# Patient Record
Sex: FEMALE | Race: WHITE | NOT HISPANIC OR LATINO | Employment: OTHER | URBAN - METROPOLITAN AREA
[De-identification: names, ages, dates, MRNs, and addresses within clinical notes are randomized per-mention and may not be internally consistent; named-entity substitution may affect disease eponyms.]

---

## 2023-01-01 ENCOUNTER — PATIENT OUTREACH (OUTPATIENT)
Dept: CARE COORDINATION | Facility: CLINIC | Age: 53
End: 2023-01-01
Payer: COMMERCIAL

## 2023-01-01 ENCOUNTER — TELEPHONE (OUTPATIENT)
Dept: PRIMARY CARE | Facility: CLINIC | Age: 53
End: 2023-01-01
Payer: COMMERCIAL

## 2023-01-01 DIAGNOSIS — E03.9 ACQUIRED HYPOTHYROIDISM: Primary | ICD-10-CM

## 2023-01-01 DIAGNOSIS — C18.9 METASTATIC COLON CANCER TO LIVER (MULTI): Primary | ICD-10-CM

## 2023-01-01 DIAGNOSIS — K12.0 ORAL APHTHOUS ULCER: Primary | ICD-10-CM

## 2023-01-01 DIAGNOSIS — R53.0 NEOPLASTIC MALIGNANT RELATED FATIGUE: ICD-10-CM

## 2023-01-01 DIAGNOSIS — C78.7 METASTATIC COLON CANCER TO LIVER (MULTI): Primary | ICD-10-CM

## 2023-01-01 DIAGNOSIS — G43.809 OTHER MIGRAINE WITHOUT STATUS MIGRAINOSUS, NOT INTRACTABLE: Primary | ICD-10-CM

## 2023-01-01 DIAGNOSIS — R53.0 NEOPLASTIC MALIGNANT RELATED FATIGUE: Primary | ICD-10-CM

## 2023-01-01 DIAGNOSIS — D50.8 IRON DEFICIENCY ANEMIA SECONDARY TO INADEQUATE DIETARY IRON INTAKE: Primary | ICD-10-CM

## 2023-01-01 DIAGNOSIS — J01.00 ACUTE NON-RECURRENT MAXILLARY SINUSITIS: Primary | ICD-10-CM

## 2023-01-01 DIAGNOSIS — K21.9 GASTROESOPHAGEAL REFLUX DISEASE WITHOUT ESOPHAGITIS: ICD-10-CM

## 2023-01-01 DIAGNOSIS — E03.9 HYPOTHYROIDISM, UNSPECIFIED: ICD-10-CM

## 2023-01-01 LAB
ALANINE AMINOTRANSFERASE (SGPT) (U/L) IN SER/PLAS: 34 U/L (ref 7–45)
ALANINE AMINOTRANSFERASE (SGPT) (U/L) IN SER/PLAS: 41 U/L (ref 7–45)
ALANINE AMINOTRANSFERASE (SGPT) (U/L) IN SER/PLAS: 47 U/L (ref 7–45)
ALBUMIN (G/DL) IN SER/PLAS: 4.1 G/DL (ref 3.4–5)
ALBUMIN (G/DL) IN SER/PLAS: 4.2 G/DL (ref 3.4–5)
ALBUMIN (G/DL) IN SER/PLAS: 4.4 G/DL (ref 3.4–5)
ALKALINE PHOSPHATASE (U/L) IN SER/PLAS: 123 U/L (ref 33–110)
ALKALINE PHOSPHATASE (U/L) IN SER/PLAS: 129 U/L (ref 33–110)
ALKALINE PHOSPHATASE (U/L) IN SER/PLAS: 174 U/L (ref 33–110)
ANION GAP IN SER/PLAS: 11 MMOL/L (ref 10–20)
ANION GAP IN SER/PLAS: 12 MMOL/L (ref 10–20)
ANION GAP IN SER/PLAS: 14 MMOL/L (ref 10–20)
ASPARTATE AMINOTRANSFERASE (SGOT) (U/L) IN SER/PLAS: 34 U/L (ref 9–39)
ASPARTATE AMINOTRANSFERASE (SGOT) (U/L) IN SER/PLAS: 40 U/L (ref 9–39)
ASPARTATE AMINOTRANSFERASE (SGOT) (U/L) IN SER/PLAS: 50 U/L (ref 9–39)
BASOPHILS (10*3/UL) IN BLOOD BY AUTOMATED COUNT: 0.05 X10E9/L (ref 0–0.1)
BASOPHILS (10*3/UL) IN BLOOD BY AUTOMATED COUNT: 0.06 X10E9/L (ref 0–0.1)
BASOPHILS (10*3/UL) IN BLOOD BY AUTOMATED COUNT: 0.07 X10E9/L (ref 0–0.1)
BASOPHILS/100 LEUKOCYTES IN BLOOD BY AUTOMATED COUNT: 1.1 % (ref 0–2)
BASOPHILS/100 LEUKOCYTES IN BLOOD BY AUTOMATED COUNT: 1.1 % (ref 0–2)
BASOPHILS/100 LEUKOCYTES IN BLOOD BY AUTOMATED COUNT: 1.2 % (ref 0–2)
BILIRUBIN TOTAL (MG/DL) IN SER/PLAS: 0.4 MG/DL (ref 0–1.2)
BILIRUBIN TOTAL (MG/DL) IN SER/PLAS: 0.4 MG/DL (ref 0–1.2)
BILIRUBIN TOTAL (MG/DL) IN SER/PLAS: 1.1 MG/DL (ref 0–1.2)
CALCIUM (MG/DL) IN SER/PLAS: 10 MG/DL (ref 8.6–10.6)
CALCIUM (MG/DL) IN SER/PLAS: 9.4 MG/DL (ref 8.6–10.6)
CALCIUM (MG/DL) IN SER/PLAS: 9.6 MG/DL (ref 8.6–10.6)
CARBON DIOXIDE, TOTAL (MMOL/L) IN SER/PLAS: 26 MMOL/L (ref 21–32)
CARBON DIOXIDE, TOTAL (MMOL/L) IN SER/PLAS: 27 MMOL/L (ref 21–32)
CARBON DIOXIDE, TOTAL (MMOL/L) IN SER/PLAS: 29 MMOL/L (ref 21–32)
CARCINOEMBRYONIC AG (NG/ML) IN SER/PLAS: 21.8 UG/L
CARCINOEMBRYONIC AG (NG/ML) IN SER/PLAS: 26.2 UG/L
CHLORIDE (MMOL/L) IN SER/PLAS: 102 MMOL/L (ref 98–107)
CHLORIDE (MMOL/L) IN SER/PLAS: 102 MMOL/L (ref 98–107)
CHLORIDE (MMOL/L) IN SER/PLAS: 104 MMOL/L (ref 98–107)
CREATININE (MG/DL) IN SER/PLAS: 0.69 MG/DL (ref 0.5–1.05)
CREATININE (MG/DL) IN SER/PLAS: 0.71 MG/DL (ref 0.5–1.05)
CREATININE (MG/DL) IN SER/PLAS: 0.82 MG/DL (ref 0.5–1.05)
EOSINOPHILS (10*3/UL) IN BLOOD BY AUTOMATED COUNT: 0.14 X10E9/L (ref 0–0.7)
EOSINOPHILS (10*3/UL) IN BLOOD BY AUTOMATED COUNT: 0.28 X10E9/L (ref 0–0.7)
EOSINOPHILS (10*3/UL) IN BLOOD BY AUTOMATED COUNT: 0.38 X10E9/L (ref 0–0.7)
EOSINOPHILS/100 LEUKOCYTES IN BLOOD BY AUTOMATED COUNT: 2.7 % (ref 0–6)
EOSINOPHILS/100 LEUKOCYTES IN BLOOD BY AUTOMATED COUNT: 6.2 % (ref 0–6)
EOSINOPHILS/100 LEUKOCYTES IN BLOOD BY AUTOMATED COUNT: 6.3 % (ref 0–6)
ERYTHROCYTE DISTRIBUTION WIDTH (RATIO) BY AUTOMATED COUNT: 14.1 % (ref 11.5–14.5)
ERYTHROCYTE DISTRIBUTION WIDTH (RATIO) BY AUTOMATED COUNT: 14.9 % (ref 11.5–14.5)
ERYTHROCYTE DISTRIBUTION WIDTH (RATIO) BY AUTOMATED COUNT: 15.1 % (ref 11.5–14.5)
ERYTHROCYTE MEAN CORPUSCULAR HEMOGLOBIN CONCENTRATION (G/DL) BY AUTOMATED: 31.6 G/DL (ref 32–36)
ERYTHROCYTE MEAN CORPUSCULAR HEMOGLOBIN CONCENTRATION (G/DL) BY AUTOMATED: 31.7 G/DL (ref 32–36)
ERYTHROCYTE MEAN CORPUSCULAR HEMOGLOBIN CONCENTRATION (G/DL) BY AUTOMATED: 32.6 G/DL (ref 32–36)
ERYTHROCYTE MEAN CORPUSCULAR VOLUME (FL) BY AUTOMATED COUNT: 85 FL (ref 80–100)
ERYTHROCYTE MEAN CORPUSCULAR VOLUME (FL) BY AUTOMATED COUNT: 90 FL (ref 80–100)
ERYTHROCYTE MEAN CORPUSCULAR VOLUME (FL) BY AUTOMATED COUNT: 90 FL (ref 80–100)
ERYTHROCYTES (10*6/UL) IN BLOOD BY AUTOMATED COUNT: 4.44 X10E12/L (ref 4–5.2)
ERYTHROCYTES (10*6/UL) IN BLOOD BY AUTOMATED COUNT: 4.46 X10E12/L (ref 4–5.2)
ERYTHROCYTES (10*6/UL) IN BLOOD BY AUTOMATED COUNT: 4.78 X10E12/L (ref 4–5.2)
GFR FEMALE: 85 ML/MIN/1.73M2
GFR FEMALE: >90 ML/MIN/1.73M2
GFR FEMALE: >90 ML/MIN/1.73M2
GLUCOSE (MG/DL) IN SER/PLAS: 89 MG/DL (ref 74–99)
GLUCOSE (MG/DL) IN SER/PLAS: 89 MG/DL (ref 74–99)
GLUCOSE (MG/DL) IN SER/PLAS: 90 MG/DL (ref 74–99)
HEMATOCRIT (%) IN BLOOD BY AUTOMATED COUNT: 38 % (ref 36–46)
HEMATOCRIT (%) IN BLOOD BY AUTOMATED COUNT: 39.8 % (ref 36–46)
HEMATOCRIT (%) IN BLOOD BY AUTOMATED COUNT: 43 % (ref 36–46)
HEMOGLOBIN (G/DL) IN BLOOD: 12.4 G/DL (ref 12–16)
HEMOGLOBIN (G/DL) IN BLOOD: 12.6 G/DL (ref 12–16)
HEMOGLOBIN (G/DL) IN BLOOD: 13.6 G/DL (ref 12–16)
IMMATURE GRANULOCYTES/100 LEUKOCYTES IN BLOOD BY AUTOMATED COUNT: 0.4 % (ref 0–0.9)
IMMATURE GRANULOCYTES/100 LEUKOCYTES IN BLOOD BY AUTOMATED COUNT: 1.1 % (ref 0–0.9)
IMMATURE GRANULOCYTES/100 LEUKOCYTES IN BLOOD BY AUTOMATED COUNT: 2 % (ref 0–0.9)
INR IN PPP BY COAGULATION ASSAY: 0.9 (ref 0.9–1.1)
LEUKOCYTES (10*3/UL) IN BLOOD BY AUTOMATED COUNT: 4.5 X10E9/L (ref 4.4–11.3)
LEUKOCYTES (10*3/UL) IN BLOOD BY AUTOMATED COUNT: 5.2 X10E9/L (ref 4.4–11.3)
LEUKOCYTES (10*3/UL) IN BLOOD BY AUTOMATED COUNT: 6.1 X10E9/L (ref 4.4–11.3)
LYMPHOCYTES (10*3/UL) IN BLOOD BY AUTOMATED COUNT: 1.6 X10E9/L (ref 1.2–4.8)
LYMPHOCYTES (10*3/UL) IN BLOOD BY AUTOMATED COUNT: 1.6 X10E9/L (ref 1.2–4.8)
LYMPHOCYTES (10*3/UL) IN BLOOD BY AUTOMATED COUNT: 1.67 X10E9/L (ref 1.2–4.8)
LYMPHOCYTES/100 LEUKOCYTES IN BLOOD BY AUTOMATED COUNT: 27.2 % (ref 13–44)
LYMPHOCYTES/100 LEUKOCYTES IN BLOOD BY AUTOMATED COUNT: 30.9 % (ref 13–44)
LYMPHOCYTES/100 LEUKOCYTES IN BLOOD BY AUTOMATED COUNT: 35.9 % (ref 13–44)
MONOCYTES (10*3/UL) IN BLOOD BY AUTOMATED COUNT: 0.14 X10E9/L (ref 0.1–1)
MONOCYTES (10*3/UL) IN BLOOD BY AUTOMATED COUNT: 0.66 X10E9/L (ref 0.1–1)
MONOCYTES (10*3/UL) IN BLOOD BY AUTOMATED COUNT: 0.67 X10E9/L (ref 0.1–1)
MONOCYTES/100 LEUKOCYTES IN BLOOD BY AUTOMATED COUNT: 10.9 % (ref 2–10)
MONOCYTES/100 LEUKOCYTES IN BLOOD BY AUTOMATED COUNT: 14.8 % (ref 2–10)
MONOCYTES/100 LEUKOCYTES IN BLOOD BY AUTOMATED COUNT: 2.7 % (ref 2–10)
NEUTROPHILS (10*3/UL) IN BLOOD BY AUTOMATED COUNT: 1.78 X10E9/L (ref 1.2–7.7)
NEUTROPHILS (10*3/UL) IN BLOOD BY AUTOMATED COUNT: 3.22 X10E9/L (ref 1.2–7.7)
NEUTROPHILS (10*3/UL) IN BLOOD BY AUTOMATED COUNT: 3.28 X10E9/L (ref 1.2–7.7)
NEUTROPHILS/100 LEUKOCYTES IN BLOOD BY AUTOMATED COUNT: 39.9 % (ref 40–80)
NEUTROPHILS/100 LEUKOCYTES IN BLOOD BY AUTOMATED COUNT: 53.5 % (ref 40–80)
NEUTROPHILS/100 LEUKOCYTES IN BLOOD BY AUTOMATED COUNT: 62.1 % (ref 40–80)
NRBC (PER 100 WBCS) BY AUTOMATED COUNT: 0 /100 WBC (ref 0–0)
PLATELETS (10*3/UL) IN BLOOD AUTOMATED COUNT: 194 X10E9/L (ref 150–450)
PLATELETS (10*3/UL) IN BLOOD AUTOMATED COUNT: 211 X10E9/L (ref 150–450)
PLATELETS (10*3/UL) IN BLOOD AUTOMATED COUNT: 259 X10E9/L (ref 150–450)
POTASSIUM (MMOL/L) IN SER/PLAS: 4.2 MMOL/L (ref 3.5–5.3)
POTASSIUM (MMOL/L) IN SER/PLAS: 4.4 MMOL/L (ref 3.5–5.3)
POTASSIUM (MMOL/L) IN SER/PLAS: 5 MMOL/L (ref 3.5–5.3)
PROTEIN TOTAL: 6.8 G/DL (ref 6.4–8.2)
PROTEIN TOTAL: 7 G/DL (ref 6.4–8.2)
PROTEIN TOTAL: 7.1 G/DL (ref 6.4–8.2)
PROTHROMBIN TIME (PT) IN PPP BY COAGULATION ASSAY: 10.7 SEC (ref 9.8–13.4)
SODIUM (MMOL/L) IN SER/PLAS: 137 MMOL/L (ref 136–145)
SODIUM (MMOL/L) IN SER/PLAS: 137 MMOL/L (ref 136–145)
SODIUM (MMOL/L) IN SER/PLAS: 140 MMOL/L (ref 136–145)
THYROTROPIN (MIU/L) IN SER/PLAS BY DETECTION LIMIT <= 0.05 MIU/L: 2.98 MIU/L (ref 0.44–3.98)
THYROXINE (T4) FREE (NG/DL) IN SER/PLAS: 1.38 NG/DL (ref 0.78–1.48)
TRIIODOTHYRONINE (T3) FREE (PG/ML) IN SER/PLAS: 2.9 PG/ML (ref 2.3–4.2)
UREA NITROGEN (MG/DL) IN SER/PLAS: 11 MG/DL (ref 6–23)
UREA NITROGEN (MG/DL) IN SER/PLAS: 12 MG/DL (ref 6–23)
UREA NITROGEN (MG/DL) IN SER/PLAS: 20 MG/DL (ref 6–23)

## 2023-01-01 RX ORDER — PANTOPRAZOLE SODIUM 40 MG/1
40 TABLET, DELAYED RELEASE ORAL DAILY
Qty: 30 TABLET | Refills: 11 | Status: SHIPPED | OUTPATIENT
Start: 2023-01-01 | End: 2024-02-05

## 2023-01-01 RX ORDER — LEVOTHYROXINE SODIUM 125 UG/1
125 TABLET ORAL DAILY
Qty: 90 TABLET | Refills: 3 | Status: SHIPPED | OUTPATIENT
Start: 2023-01-01 | End: 2024-02-05

## 2023-01-01 RX ORDER — OXYCODONE HYDROCHLORIDE 5 MG/1
1 TABLET ORAL EVERY 4 HOURS PRN
COMMUNITY
Start: 2023-01-01 | End: 2024-01-01 | Stop reason: SDUPTHER

## 2023-01-01 RX ORDER — LEVOTHYROXINE SODIUM 125 UG/1
TABLET ORAL
Qty: 90 TABLET | Refills: 0 | Status: CANCELLED | OUTPATIENT
Start: 2023-01-01

## 2023-01-01 RX ORDER — IBUPROFEN 200 MG
200-400 TABLET ORAL EVERY 2 HOUR PRN
COMMUNITY
Start: 2023-01-01

## 2023-01-01 RX ORDER — LEVOTHYROXINE SODIUM 125 UG/1
125 TABLET ORAL DAILY
COMMUNITY
Start: 2023-01-01 | End: 2023-01-01 | Stop reason: SDUPTHER

## 2023-01-01 RX ORDER — CHLORHEXIDINE GLUCONATE 4 %
3 LIQUID (ML) TOPICAL NIGHTLY PRN
COMMUNITY

## 2023-01-01 RX ORDER — ESTRADIOL 10 UG/1
1 INSERT VAGINAL 2 TIMES WEEKLY
COMMUNITY
Start: 2021-05-04

## 2023-01-01 RX ORDER — HEPARIN SODIUM,PORCINE 10 UNIT/ML
20-50 VIAL (ML) INTRAVENOUS 3 TIMES DAILY
COMMUNITY
Start: 2022-11-02

## 2023-01-01 RX ORDER — BUTALBITAL, ACETAMINOPHEN AND CAFFEINE 50; 325; 40 MG/1; MG/1; MG/1
1-2 TABLET ORAL EVERY 6 HOURS PRN
Qty: 30 TABLET | Refills: 0 | Status: SHIPPED | OUTPATIENT
Start: 2023-01-01 | End: 2024-02-05

## 2023-01-01 RX ORDER — FLUTICASONE PROPIONATE 50 MCG
2 SPRAY, SUSPENSION (ML) NASAL DAILY
Qty: 16 G | Refills: 11 | Status: SHIPPED | OUTPATIENT
Start: 2023-01-01 | End: 2024-02-05

## 2023-01-01 RX ORDER — ENOXAPARIN SODIUM 100 MG/ML
80 INJECTION SUBCUTANEOUS
COMMUNITY
Start: 2022-03-11

## 2023-01-01 RX ORDER — LORAZEPAM 0.5 MG/1
0.5 TABLET ORAL DAILY PRN
COMMUNITY
Start: 2021-06-08

## 2023-01-01 RX ORDER — ONDANSETRON HYDROCHLORIDE 8 MG/1
1 TABLET, FILM COATED ORAL EVERY 8 HOURS
COMMUNITY
Start: 2022-08-29

## 2023-01-01 RX ORDER — ESCITALOPRAM OXALATE 10 MG/1
10 TABLET ORAL
Qty: 90 TABLET | Refills: 3
Start: 2023-01-01 | End: 2024-02-05

## 2023-01-01 RX ORDER — ESCITALOPRAM OXALATE 10 MG/1
10 TABLET ORAL
COMMUNITY
End: 2023-01-01 | Stop reason: SDUPTHER

## 2023-01-01 RX ORDER — ACETAMINOPHEN 325 MG/1
2 TABLET ORAL EVERY 4 HOURS PRN
COMMUNITY
Start: 2022-03-11

## 2023-01-01 RX ORDER — ESCITALOPRAM OXALATE 20 MG/1
20 TABLET ORAL
Qty: 90 TABLET | Refills: 3 | Status: SHIPPED | OUTPATIENT
Start: 2023-01-01 | End: 2023-01-01 | Stop reason: SDUPTHER

## 2023-01-01 RX ORDER — AZITHROMYCIN 250 MG/1
TABLET, FILM COATED ORAL
Qty: 6 TABLET | Refills: 0 | Status: SHIPPED | OUTPATIENT
Start: 2023-01-01 | End: 2023-01-01

## 2023-01-01 RX ORDER — DIAZEPAM 10 MG/1
10 TABLET ORAL AS NEEDED
Qty: 7 TABLET | Refills: 0 | Status: SHIPPED | OUTPATIENT
Start: 2023-01-01 | End: 2023-01-01

## 2023-01-01 RX ORDER — DICLOFENAC SODIUM 10 MG/G
2 GEL TOPICAL 4 TIMES DAILY
COMMUNITY
Start: 2023-01-01 | End: 2023-01-01

## 2023-01-01 RX ORDER — LINEZOLID 600 MG/1
600 TABLET, FILM COATED ORAL 2 TIMES DAILY
COMMUNITY

## 2023-01-01 RX ORDER — LIDOCAINE HYDROCHLORIDE 20 MG/ML
1.25 SOLUTION OROPHARYNGEAL AS NEEDED
COMMUNITY
Start: 2023-01-01

## 2023-01-01 RX ORDER — ACETAMINOPHEN 500 MG
1 TABLET ORAL
COMMUNITY

## 2023-05-31 NOTE — PROGRESS NOTES
I spoke with the patient on the phone, she has worsening sinus drainage with a cough, symptoms for 1 week,  CoVid test is negative,  no fever, no shortness of breath, normal pulse ox, normal fluid intake. Thinks it is a sinus infection, not improving with oral antihistamine, I sent in a zpack and flonase, call back in 1-2 days if not improving , call tomorrow if worsening

## 2023-08-07 NOTE — PROGRESS NOTES
The patient called requesting a refill for her protonix to help with her GERD. Also, she has to get a MRI of the Brain, requesting refill of the diazepam.  Her previous Oncologist prescribed this to her. I sent in both to the local pharmacy.I have personally reviewed the OARRS report for Rody Mackay. I have considered the risks of abuse, dependence, addiction and diversion

## 2023-09-27 PROBLEM — R91.1 LUNG NODULE: Status: ACTIVE | Noted: 2023-01-01

## 2023-09-27 PROBLEM — F43.21 SITUATIONAL DEPRESSION: Status: ACTIVE | Noted: 2023-01-01

## 2023-09-27 PROBLEM — C18.6 MALIGNANT NEOPLASM OF DESCENDING COLON (MULTI): Status: ACTIVE | Noted: 2023-01-01

## 2023-09-27 PROBLEM — E03.9 HYPOTHYROID: Status: ACTIVE | Noted: 2023-01-01

## 2023-09-27 PROBLEM — Z93.2 ILEOSTOMY IN PLACE (MULTI): Status: ACTIVE | Noted: 2021-06-22

## 2023-09-27 PROBLEM — K56.609 COLONIC OBSTRUCTION (MULTI): Status: ACTIVE | Noted: 2017-06-10

## 2023-09-27 PROBLEM — C18.9 COLON CANCER METASTASIZED TO LIVER (MULTI): Status: ACTIVE | Noted: 2021-06-15

## 2023-09-27 PROBLEM — C79.31 MALIGNANT NEOPLASM METASTATIC TO BRAIN (MULTI): Status: ACTIVE | Noted: 2021-12-17

## 2023-09-27 PROBLEM — C78.7 COLON CANCER METASTASIZED TO LIVER (MULTI): Status: ACTIVE | Noted: 2021-06-15

## 2023-09-27 PROBLEM — C77.2: Status: ACTIVE | Noted: 2023-01-01

## 2023-09-27 PROBLEM — N95.0 PMB (POSTMENOPAUSAL BLEEDING): Status: ACTIVE | Noted: 2023-01-01

## 2023-09-27 PROBLEM — C76.2 ABDOMINAL CARCINOMATOSIS (MULTI): Status: ACTIVE | Noted: 2021-06-21

## 2023-09-27 PROBLEM — M54.2 CERVICAL PAIN (NECK): Status: ACTIVE | Noted: 2023-01-01

## 2023-09-27 PROBLEM — R79.89 ABNORMAL THYROID BLOOD TEST: Status: ACTIVE | Noted: 2023-01-01

## 2023-09-27 PROBLEM — G93.6: Status: ACTIVE | Noted: 2021-12-17

## 2023-09-27 PROBLEM — I72.8: Status: ACTIVE | Noted: 2023-01-01

## 2023-09-27 PROBLEM — F41.8 SITUATIONAL ANXIETY: Status: ACTIVE | Noted: 2023-01-01

## 2023-09-27 PROBLEM — C20: Status: ACTIVE | Noted: 2023-01-01

## 2023-09-27 PROBLEM — E55.9 VITAMIN D DEFICIENCY: Status: ACTIVE | Noted: 2023-01-01

## 2023-09-27 PROBLEM — R16.0 LIVER MASSES: Status: ACTIVE | Noted: 2023-01-01

## 2023-09-27 PROBLEM — G47.00 INSOMNIA: Status: ACTIVE | Noted: 2023-01-01

## 2023-09-27 PROBLEM — G56.02 CARPAL TUNNEL SYNDROME OF LEFT WRIST: Status: ACTIVE | Noted: 2023-01-01

## 2023-09-27 PROBLEM — C78.01 MALIGNANT NEOPLASM METASTATIC TO RIGHT LUNG (MULTI): Status: ACTIVE | Noted: 2023-01-01

## 2023-09-27 PROBLEM — D49.6 BRAIN TUMOR (MULTI): Status: ACTIVE | Noted: 2022-01-13

## 2023-09-27 PROBLEM — R06.02 SHORTNESS OF BREATH ON EXERTION: Status: ACTIVE | Noted: 2023-01-01

## 2023-09-27 PROBLEM — N93.9 ABNORMAL VAGINAL BLEEDING: Status: ACTIVE | Noted: 2023-01-01

## 2023-09-27 PROBLEM — R73.01 ELEVATED FASTING BLOOD SUGAR: Status: ACTIVE | Noted: 2023-01-01

## 2023-09-27 PROBLEM — N83.8 OVARIAN MASS: Status: ACTIVE | Noted: 2023-01-01

## 2023-09-27 PROBLEM — R53.83 FATIGUE: Status: ACTIVE | Noted: 2023-01-01

## 2023-10-18 NOTE — TELEPHONE ENCOUNTER
Unfortunately the IV medication needs to be managed by the prescribing doctor, which is infectious disease, assuming it is the oxacillin antibiotic.  I can follow-up for all other routine needs.

## 2023-10-18 NOTE — TELEPHONE ENCOUNTER
Chio from [facility] called about this patient. They are requesting to see if you would follow this patient for skilled nursing for IV Therapy in home and any other home come orders. A verbal can be given at  [phone]. A detailed message can be left on her voicemail.

## 2023-12-07 NOTE — PROGRESS NOTES
The patient notified me of fatigue and recent labs indicating anemia,  she is also struggling with extensive canker sores in her mouth,  She is asking me about an Iron infusion, I told her we should check her iron level first, I called the [facility] and they need the order faxed, we faxed the order, I spoke with Paige and she will contact her oncologist at the [facility] in regards to getting the infusion of Iron,  I will send in a new script for her mouth wash to help reduce her pain in the mouth.

## 2023-12-07 NOTE — TELEPHONE ENCOUNTER
Please fax the orders I just printed to the [facility]  lab  [phone].  She had labs done at [facility] on Tuesday,  I spoke with the [facility] lab at [place],  please fax the orders for the iron and the ferritin as soon as possible

## 2023-12-15 NOTE — PROGRESS NOTES
Discharge Facility:[facility]  Discharge Diagnosis:Hypokalemia  Admission Date:12/8/2023  Discharge Date: 12/14/2023    PCP Appointment Date:Declined, will fu with oncology, wound care  Specialist Appointment Date: Oncology 12/20/2023  Hospital Encounter and Summary:  not available at this time  See discharge assessment below for further details  Engagement  Call Start Time: 1521 (12/15/2023  3:29 PM)    Medications  Medications reviewed with patient/caregiver?: Yes (Linezolid 600 mg one bid x 4 days, Oxycodone IR 5 mg 1-2 q 6 hours prn. To hold Lexapro until Linezolid complete) (12/15/2023  3:29 PM)  Is the patient having any side effects they believe may be caused by any medication additions or changes?: No (12/15/2023  3:29 PM)  Does the patient have all medications ordered at discharge?: Yes (12/15/2023  3:29 PM)  Care Management Interventions: No intervention needed (12/15/2023  3:29 PM)  Is the patient taking all medications as directed (includes completed medication regime)?: Yes (12/15/2023  3:29 PM)    Appointments  Does the patient have a primary care provider?: Yes (12/15/2023  3:29 PM)  Care Management Interventions: -- (Had follow up with wound care today-went well, 12/20/23 Oncology) (12/15/2023  3:29 PM)  Has the patient kept scheduled appointments due by today?: Yes (12/15/2023  3:29 PM)    Self Management  What is the home health agency?: -- ([facility] will resume) (12/15/2023  3:29 PM)  Has home health visited the patient within 72 hours of discharge?: Call prior to 72 hours (12/15/2023  3:29 PM)    Patient Teaching  Does the patient have access to their discharge instructions?: Yes (12/15/2023  3:29 PM)  What is the patient's perception of their health status since discharge?: Improving (12/15/2023  3:29 PM)  Is the patient/caregiver able to teach back the hierarchy of who to call/visit for symptoms/problems? PCP, Specialist, Home Health nurse, Urgent Care, ED, 911: Yes (12/15/2023  3:29  PM)    Wrap Up  Wrap Up Additional Comments: -- (Rody saw wound care and is healing nicely. She is taking Meds as directed with no problems. She will fu with Oncology.) (12/15/2023  3:29 PM)

## 2024-01-01 DIAGNOSIS — C18.9 ADENOCARCINOMA OF COLON METASTATIC TO LIVER (MULTI): Primary | ICD-10-CM

## 2024-01-01 DIAGNOSIS — R10.84 GENERALIZED ABDOMINAL PAIN: Primary | ICD-10-CM

## 2024-01-01 DIAGNOSIS — C78.7 ADENOCARCINOMA OF COLON METASTATIC TO LIVER (MULTI): Primary | ICD-10-CM

## 2024-01-01 RX ORDER — GABAPENTIN 100 MG/1
100 CAPSULE ORAL NIGHTLY
Qty: 30 CAPSULE | Refills: 0 | Status: SHIPPED | OUTPATIENT
Start: 2024-01-01 | End: 2024-02-01

## 2024-01-01 RX ORDER — TRAMADOL HYDROCHLORIDE 50 MG/1
50 TABLET ORAL EVERY 6 HOURS PRN
Qty: 28 TABLET | Refills: 0 | Status: SHIPPED | OUTPATIENT
Start: 2024-01-01 | End: 2024-01-01

## 2024-01-01 RX ORDER — ONDANSETRON 4 MG/1
4 TABLET, ORALLY DISINTEGRATING ORAL EVERY 8 HOURS PRN
Qty: 30 TABLET | Refills: 0 | Status: SHIPPED | OUTPATIENT
Start: 2024-01-01 | End: 2024-01-01

## 2024-01-01 RX ORDER — OXYCODONE HYDROCHLORIDE 5 MG/1
5 TABLET ORAL EVERY 4 HOURS PRN
Qty: 28 TABLET | Refills: 0 | Status: SHIPPED | OUTPATIENT
Start: 2024-01-01 | End: 2024-01-01

## 2024-01-02 NOTE — PROGRESS NOTES
I spoke with the patient ,  she was seen today by the wound center at [facility].  She has labs pending, She has an U/S of the liver scheduled for tomorrow.  She is having an increase in her abdominal discomfort,  She is taking tylenol and ibuprofen as needed with no relief,  She is taking oxycodone as well with some relief.  She has Metastatic colon cancer with liver mets.  She has increased jaundiced.  She has appointments with her Oncologist on Thursday.  I am recommending Gabapentin 100 mg at bed time, if this is helpful increase to three times a day to help with pain.  I am recommending Ondanestron 4 mg as needed for nausea.  She will try to stay well hydrated contact me if she needs anything else

## 2024-01-03 NOTE — PROGRESS NOTES
The patient continues to have pain,  not much relief with low dose gabapentin,  I will send in tramadol, 50 mg one every 6 hours,  and recommend she continue on the gabapentin 100 mg reduce pain at night time. She is scheduled for an ultrasound today of the liver to further evaluate the obstruction,  Bilirubin is now up to 8.  She has been using oxycodone sparingly.  She is requesting a refill of this as well.   I have reviewed the OARRS on this patient, I have accessed for the risk of abuse, dependence and diversion. It is my opinion she needs these medications due to the pain from her metastatic colon cancer and her liver mets

## 2024-02-09 ENCOUNTER — APPOINTMENT (OUTPATIENT)
Dept: GYNECOLOGIC ONCOLOGY | Facility: CLINIC | Age: 54
End: 2024-02-09
Payer: COMMERCIAL